# Patient Record
Sex: FEMALE | Race: WHITE | Employment: FULL TIME | URBAN - METROPOLITAN AREA
[De-identification: names, ages, dates, MRNs, and addresses within clinical notes are randomized per-mention and may not be internally consistent; named-entity substitution may affect disease eponyms.]

---

## 2017-02-16 ENCOUNTER — OFFICE VISIT (OUTPATIENT)
Dept: OBGYN | Age: 47
End: 2017-02-16

## 2017-02-16 VITALS
DIASTOLIC BLOOD PRESSURE: 60 MMHG | SYSTOLIC BLOOD PRESSURE: 90 MMHG | WEIGHT: 169 LBS | BODY MASS INDEX: 26.53 KG/M2 | HEIGHT: 67 IN

## 2017-02-16 DIAGNOSIS — Z12.39 BREAST CANCER SCREENING: ICD-10-CM

## 2017-02-16 DIAGNOSIS — Z01.419 NORMAL GYNECOLOGIC EXAMINATION: Primary | ICD-10-CM

## 2017-02-16 PROCEDURE — 99396 PREV VISIT EST AGE 40-64: CPT | Performed by: OBSTETRICS & GYNECOLOGY

## 2017-02-16 RX ORDER — NICOTINE POLACRILEX 4 MG/1
20 GUM, CHEWING ORAL DAILY
COMMUNITY
End: 2018-10-08

## 2017-02-16 RX ORDER — IBUPROFEN 600 MG/1
600 TABLET ORAL EVERY 6 HOURS PRN
Qty: 180 TABLET | Refills: 4 | Status: SHIPPED | OUTPATIENT
Start: 2017-02-16 | End: 2018-10-08 | Stop reason: SDUPTHER

## 2017-02-17 LAB — PAP SMEAR: NORMAL

## 2017-02-28 ENCOUNTER — HOSPITAL ENCOUNTER (OUTPATIENT)
Dept: WOMENS IMAGING | Age: 47
Discharge: HOME | End: 2017-02-28
Payer: COMMERCIAL

## 2017-02-28 DIAGNOSIS — Z01.419 NORMAL GYNECOLOGIC EXAMINATION: ICD-10-CM

## 2017-02-28 DIAGNOSIS — Z12.39 BREAST CANCER SCREENING: ICD-10-CM

## 2017-02-28 PROCEDURE — G0202 SCR MAMMO BI INCL CAD: HCPCS

## 2018-10-08 ENCOUNTER — OFFICE VISIT (OUTPATIENT)
Dept: OBGYN CLINIC | Age: 48
End: 2018-10-08
Payer: COMMERCIAL

## 2018-10-08 VITALS
BODY MASS INDEX: 26.84 KG/M2 | HEIGHT: 67 IN | DIASTOLIC BLOOD PRESSURE: 68 MMHG | WEIGHT: 171 LBS | SYSTOLIC BLOOD PRESSURE: 104 MMHG

## 2018-10-08 DIAGNOSIS — Z11.51 SCREENING FOR HPV (HUMAN PAPILLOMAVIRUS): ICD-10-CM

## 2018-10-08 DIAGNOSIS — Z12.39 BREAST CANCER SCREENING: ICD-10-CM

## 2018-10-08 DIAGNOSIS — Z01.419 WELL WOMAN EXAM WITH ROUTINE GYNECOLOGICAL EXAM: Primary | ICD-10-CM

## 2018-10-08 PROCEDURE — 99396 PREV VISIT EST AGE 40-64: CPT | Performed by: OBSTETRICS & GYNECOLOGY

## 2018-10-08 PROCEDURE — G8484 FLU IMMUNIZE NO ADMIN: HCPCS | Performed by: OBSTETRICS & GYNECOLOGY

## 2018-10-08 RX ORDER — IBUPROFEN 600 MG/1
600 TABLET ORAL EVERY 6 HOURS PRN
Qty: 180 TABLET | Refills: 4 | Status: SHIPPED | OUTPATIENT
Start: 2018-10-08 | End: 2020-02-13 | Stop reason: SDUPTHER

## 2018-10-08 RX ORDER — PANTOPRAZOLE SODIUM 40 MG/1
TABLET, DELAYED RELEASE ORAL
Refills: 0 | COMMUNITY
Start: 2018-09-19 | End: 2020-02-13

## 2018-10-08 NOTE — PROGRESS NOTES
with hpv pending  [facility] referral sent in   Rx ibuprofen to pharmacy  Past medical, social and family history reviewed and updated in pt's chart. Routine health screenings and precautions discussed.

## 2018-10-11 ENCOUNTER — HOSPITAL ENCOUNTER (OUTPATIENT)
Dept: WOMENS IMAGING | Age: 48
Discharge: HOME | End: 2018-10-13
Payer: COMMERCIAL

## 2018-10-11 DIAGNOSIS — Z12.39 BREAST CANCER SCREENING: ICD-10-CM

## 2018-10-11 PROCEDURE — 77067 SCR MAMMO BI INCL CAD: CPT

## 2018-10-17 DIAGNOSIS — Z11.51 SCREENING FOR HPV (HUMAN PAPILLOMAVIRUS): ICD-10-CM

## 2018-10-17 DIAGNOSIS — Z01.419 WELL WOMAN EXAM WITH ROUTINE GYNECOLOGICAL EXAM: ICD-10-CM

## 2020-02-13 ENCOUNTER — TELEPHONE (OUTPATIENT)
Dept: OBGYN CLINIC | Age: 50
End: 2020-02-13

## 2020-02-13 ENCOUNTER — OFFICE VISIT (OUTPATIENT)
Dept: OBGYN CLINIC | Age: 50
End: 2020-02-13
Payer: COMMERCIAL

## 2020-02-13 VITALS
SYSTOLIC BLOOD PRESSURE: 116 MMHG | BODY MASS INDEX: 26.37 KG/M2 | DIASTOLIC BLOOD PRESSURE: 70 MMHG | WEIGHT: 168 LBS | HEIGHT: 67 IN

## 2020-02-13 PROCEDURE — 99396 PREV VISIT EST AGE 40-64: CPT | Performed by: OBSTETRICS & GYNECOLOGY

## 2020-02-13 PROCEDURE — G8484 FLU IMMUNIZE NO ADMIN: HCPCS | Performed by: OBSTETRICS & GYNECOLOGY

## 2020-02-13 RX ORDER — MOMETASONE FUROATE 50 UG/1
SPRAY, METERED NASAL
COMMUNITY
Start: 2019-07-16 | End: 2020-02-13

## 2020-02-13 RX ORDER — IBUPROFEN 600 MG/1
600 TABLET ORAL EVERY 6 HOURS PRN
Qty: 180 TABLET | Refills: 4 | Status: SHIPPED | OUTPATIENT
Start: 2020-02-13 | End: 2022-01-31

## 2020-02-13 ASSESSMENT — PATIENT HEALTH QUESTIONNAIRE - PHQ9
SUM OF ALL RESPONSES TO PHQ QUESTIONS 1-9: 0
2. FEELING DOWN, DEPRESSED OR HOPELESS: 0
SUM OF ALL RESPONSES TO PHQ QUESTIONS 1-9: 0
SUM OF ALL RESPONSES TO PHQ9 QUESTIONS 1 & 2: 0
1. LITTLE INTEREST OR PLEASURE IN DOING THINGS: 0

## 2020-02-13 NOTE — PROGRESS NOTES
with hpv pending  [facility] referral sent in   Past medical, social and family history reviewed and updated in pt's chart. Routine health screenings and precautions discussed.

## 2020-02-17 ENCOUNTER — HOSPITAL ENCOUNTER (OUTPATIENT)
Dept: WOMENS IMAGING | Age: 50
Discharge: HOME | End: 2020-02-19
Payer: COMMERCIAL

## 2020-02-17 PROCEDURE — 77063 BREAST TOMOSYNTHESIS BI: CPT

## 2022-01-31 ENCOUNTER — OFFICE VISIT (OUTPATIENT)
Dept: OBGYN CLINIC | Age: 52
End: 2022-01-31
Payer: COMMERCIAL

## 2022-01-31 VITALS
HEIGHT: 66 IN | DIASTOLIC BLOOD PRESSURE: 68 MMHG | BODY MASS INDEX: 27.48 KG/M2 | WEIGHT: 171 LBS | SYSTOLIC BLOOD PRESSURE: 90 MMHG

## 2022-01-31 DIAGNOSIS — R61 NIGHT SWEATS: ICD-10-CM

## 2022-01-31 DIAGNOSIS — Z12.31 SCREENING MAMMOGRAM, ENCOUNTER FOR: ICD-10-CM

## 2022-01-31 DIAGNOSIS — Z11.51 ENCOUNTER FOR SCREENING FOR HUMAN PAPILLOMAVIRUS (HPV): ICD-10-CM

## 2022-01-31 DIAGNOSIS — Z01.419 ENCOUNTER FOR WELL WOMAN EXAM WITH ROUTINE GYNECOLOGICAL EXAM: Primary | ICD-10-CM

## 2022-01-31 DIAGNOSIS — Z12.11 ENCOUNTER FOR SCREENING COLONOSCOPY: ICD-10-CM

## 2022-01-31 LAB — TSH REFLEX: 1.11 UIU/ML (ref 0.44–3.86)

## 2022-01-31 PROCEDURE — 99396 PREV VISIT EST AGE 40-64: CPT | Performed by: OBSTETRICS & GYNECOLOGY

## 2022-01-31 PROCEDURE — G8484 FLU IMMUNIZE NO ADMIN: HCPCS | Performed by: OBSTETRICS & GYNECOLOGY

## 2022-01-31 PROCEDURE — 36415 COLL VENOUS BLD VENIPUNCTURE: CPT | Performed by: OBSTETRICS & GYNECOLOGY

## 2022-01-31 RX ORDER — IBUPROFEN 600 MG/1
600 TABLET ORAL EVERY 6 HOURS PRN
Qty: 120 TABLET | Refills: 3 | Status: SHIPPED | OUTPATIENT
Start: 2022-01-31

## 2022-01-31 ASSESSMENT — PATIENT HEALTH QUESTIONNAIRE - PHQ9
SUM OF ALL RESPONSES TO PHQ QUESTIONS 1-9: 0
2. FEELING DOWN, DEPRESSED OR HOPELESS: 0
SUM OF ALL RESPONSES TO PHQ QUESTIONS 1-9: 0
SUM OF ALL RESPONSES TO PHQ QUESTIONS 1-9: 0
1. LITTLE INTEREST OR PLEASURE IN DOING THINGS: 0
SUM OF ALL RESPONSES TO PHQ9 QUESTIONS 1 & 2: 0
SUM OF ALL RESPONSES TO PHQ QUESTIONS 1-9: 0

## 2022-01-31 NOTE — PROGRESS NOTES
SUBJECTIVE:   49 y.o.  female here for annual exam. Pt with irregular menses. PT does report increasing night sweats. PT interested in hormone testing. Review of Systems:  General ROS: negative  Psychological ROS: negative  ENT ROS: negative  Endocrine ROS: negative  Respiratory ROS: no cough, shortness of breath, or wheezing  Cardiovascular ROS: no chest pain or dyspnea on exertion  Gastrointestinal ROS: no abdominal pain, change in bowel habits, or black or bloody stools  Genito-Urinary ROS: no dysuria, trouble voiding, or hematuria  Musculoskeletal ROS: negative  Neurological ROS: no TIA or stroke symptoms  Dermatological ROS: negative    OBJECTIVE:   BP 90/68   Ht 5' 6\" (1.676 m)   Wt 171 lb (77.6 kg)   LMP 2022   BMI 27.60 kg/m²     Physical Exam:  CONSTITUTIONAL: She appears well nourished and developed   NEUROLOGIC: Alert and oriented to time, place and person  NECK: no thyroidmegaly  BREASTS: Bilateral breasts without masses, lesions or nipple discharge  LUNGS: Clear to ascultation bilaterally  CVS: regular rate and rhythm  LYMPHATIC: No palpable lymph nodes  ABDOMEN: benign, soft, nontender, no masses. No liver or splenic organomegaly. No evidence of abdominal or inguinal hernia. No indication for occult blood testing  SKIN: normal texture and tone, no lesions  NEURO: normal tone, no hyperreflexia, 1+DTRs throughout    Pelvic Exam:   EFG: normal external genitalia  URETHRAL MEATUS: normal size, no diverticula   URETHRA: normal appearing without diverticula or lesions  BLADDER:  No masses or tenderness  VAGINA: normal rugae, no discharge   CERVIX: parous, no lesions  UTERUS: uterus is normal size, shape, consistency and nontender   ADNEXA: normal adnexa in size, nontender and no masses. PERINEUM: normal appearing without lesions or masses  RECTUM: no hemorrhoids or rectal masses.      ASSESSMENT:   Routine gynecologic exam  Night sweats  Breast cancer screening    PLAN:   Pap with hpv pending  [facility] referral sent in   Referral for colonoscopy   rx ibuprofen to pharmacy   FSH/TSH pending  Past medical, social and family history reviewed and updated in pt's chart. Routine health screenings and precautions discussed.

## 2022-01-31 NOTE — PROGRESS NOTES
The patient was asked if she would like a chaperone present for her intimate exam. She  Declined the chaperone.  George Rosas MA

## 2022-02-01 LAB — FOLLICLE STIMULATING HORMONE: 16.9 U/L (ref 1.7–21.5)

## 2022-02-09 DIAGNOSIS — Z11.51 ENCOUNTER FOR SCREENING FOR HUMAN PAPILLOMAVIRUS (HPV): ICD-10-CM

## 2022-02-09 DIAGNOSIS — Z01.419 ENCOUNTER FOR WELL WOMAN EXAM WITH ROUTINE GYNECOLOGICAL EXAM: ICD-10-CM

## 2022-02-23 ENCOUNTER — HOSPITAL ENCOUNTER (OUTPATIENT)
Dept: WOMENS IMAGING | Age: 52
Discharge: HOME | End: 2022-02-25
Payer: COMMERCIAL

## 2022-02-23 VITALS — HEIGHT: 66 IN | BODY MASS INDEX: 27.6 KG/M2

## 2022-02-23 DIAGNOSIS — Z12.31 SCREENING MAMMOGRAM, ENCOUNTER FOR: ICD-10-CM

## 2022-02-23 PROCEDURE — 77063 BREAST TOMOSYNTHESIS BI: CPT

## 2023-02-24 ENCOUNTER — TRANSCRIBE ORDERS (OUTPATIENT)
Dept: ULTRASOUND IMAGING | Age: 53
End: 2023-02-24

## 2023-02-24 ENCOUNTER — HOSPITAL ENCOUNTER (OUTPATIENT)
Dept: WOMENS IMAGING | Age: 53
Discharge: HOME | End: 2023-02-24
Payer: COMMERCIAL

## 2023-02-24 DIAGNOSIS — Z12.31 SCREENING MAMMOGRAM FOR BREAST CANCER: Primary | ICD-10-CM

## 2023-02-24 DIAGNOSIS — Z12.31 SCREENING MAMMOGRAM FOR BREAST CANCER: ICD-10-CM

## 2023-02-24 PROCEDURE — 77067 SCR MAMMO BI INCL CAD: CPT

## 2023-10-31 ENCOUNTER — OFFICE VISIT (OUTPATIENT)
Dept: PRIMARY CARE | Facility: CLINIC | Age: 53
End: 2023-10-31
Payer: COMMERCIAL

## 2023-10-31 ENCOUNTER — APPOINTMENT (OUTPATIENT)
Dept: PRIMARY CARE | Facility: CLINIC | Age: 53
End: 2023-10-31
Payer: COMMERCIAL

## 2023-10-31 VITALS
WEIGHT: 170 LBS | OXYGEN SATURATION: 98 % | BODY MASS INDEX: 26.68 KG/M2 | HEIGHT: 67 IN | DIASTOLIC BLOOD PRESSURE: 74 MMHG | SYSTOLIC BLOOD PRESSURE: 120 MMHG | HEART RATE: 66 BPM

## 2023-10-31 DIAGNOSIS — D50.8 HYPOCHROMIC ERYTHROCYTES: ICD-10-CM

## 2023-10-31 DIAGNOSIS — Z12.11 SCREEN FOR COLON CANCER: ICD-10-CM

## 2023-10-31 DIAGNOSIS — R13.10 DYSPHAGIA, UNSPECIFIED TYPE: ICD-10-CM

## 2023-10-31 DIAGNOSIS — Z86.2 HISTORY OF ANEMIA: ICD-10-CM

## 2023-10-31 DIAGNOSIS — Z11.59 NEED FOR HEPATITIS C SCREENING TEST: ICD-10-CM

## 2023-10-31 DIAGNOSIS — Z79.899 DRUG THERAPY: ICD-10-CM

## 2023-10-31 DIAGNOSIS — Z00.00 PREVENTATIVE HEALTH CARE: Primary | ICD-10-CM

## 2023-10-31 DIAGNOSIS — E55.9 VITAMIN D DEFICIENCY: ICD-10-CM

## 2023-10-31 DIAGNOSIS — Z13.9 SCREENING FOR CONDITION: ICD-10-CM

## 2023-10-31 PROCEDURE — 99396 PREV VISIT EST AGE 40-64: CPT | Performed by: FAMILY MEDICINE

## 2023-10-31 NOTE — PROGRESS NOTES
Subjective   Patient ID: Elisabeth Walker is a 53 y.o. female who presents for Establish Care (Pt in today to establish care).    Review of Systems  Denies N/V/D/C, no HA/S/V, denies rashes/lesions, no CP/SOB. Denies fevers/chills.  All other systems were negative.    Objective   Physical Exam  Gen: NAD  eyes: EOMI, PERRLA  ENT: hearing grossly intact, no nasal discharge  resp: CTABL, without R/R  heart: RRR without MRG  GI: abd: S/ND/NT, BS+  lymph: no axillary, cervical, supraclavicular lymphadenopathy noted   MS: gait grossly WNL,  derm: no rashes or lesions noted  neuro: CN II-XII grossly intact  psych: A&Ox3    Assessment/Plan   Diagnoses and all orders for this visit:  Preventative health care  -     Ferritin; Future  -     Iron and TIBC; Future  -     Vitamin B12; Future  -     Folate; Future  -     CBC and Auto Differential; Future  -     Comprehensive Metabolic Panel; Future  -     TSH with reflex to Free T4 if abnormal; Future  -     Magnesium; Future  -     Lipid Panel; Future  -     Hemoglobin A1C; Future  -     Urinalysis with Reflex Microscopic; Future  -     Vitamin D 25-Hydroxy,Total (for eval of Vitamin D levels); Future  -     Hepatitis C Antibody; Future  Dysphagia, unspecified type  -     Referral to Gastroenterology; Future  Screen for colon cancer  -     Referral to Gastroenterology; Future  Drug therapy  -     CBC and Auto Differential; Future  -     Comprehensive Metabolic Panel; Future  -     Magnesium; Future  -     Urinalysis with Reflex Microscopic; Future  Screening for condition  -     TSH with reflex to Free T4 if abnormal; Future  -     Lipid Panel; Future  -     Hemoglobin A1C; Future  Hypochromic erythrocytes  -     Ferritin; Future  -     Iron and TIBC; Future  -     Vitamin B12; Future  -     Folate; Future  -     CBC and Auto Differential; Future  Need for hepatitis C screening test  -     Hepatitis C Antibody; Future  History of anemia  Vitamin D deficiency  -     Vitamin D  25-Hydroxy,Total (for eval of Vitamin D levels); Future         Preventative visit, establish.,  Order labs.    ----  Screening for colon cancer.  Patient is due.  Referred to gastroenterology for dysphagia.    Screening for cervical cancer as well as breast cancer, sees OB/GYN.  Follow-up as planned.    Screening for hepatitis C will be done with next labs.      Due for annual flu shot, coronavirus series, shingles series.    Patient believes most recent tetanus shot was around 2019.  -->> Check with your pharmacy to keep up-to-date on immunizations.  -----      dysphagia, due for screening colonoscopy. -->> Will refer to gastroenterology for evaluation and treatment.    Hypochromia with history of iron deficiency with pregnancy. -->>  We will check anemia panel with labs.    Drug therapy, screening for condition, vitamin D deficiency.  Takes 4000 units daily. -->>  Recommend increasing to 5000 units daily.  Will check with annual labs.      - Return mid July for annual preventative visit with annual lab review.  - Patient will go to [facility] or other  facility to get fasting annual labs including an anemia panel which were ordered today.

## 2023-11-01 ENCOUNTER — OFFICE VISIT (OUTPATIENT)
Dept: GASTROENTEROLOGY | Facility: CLINIC | Age: 53
End: 2023-11-01
Payer: COMMERCIAL

## 2023-11-01 VITALS
BODY MASS INDEX: 27.64 KG/M2 | WEIGHT: 172 LBS | HEART RATE: 67 BPM | HEIGHT: 66 IN | SYSTOLIC BLOOD PRESSURE: 121 MMHG | DIASTOLIC BLOOD PRESSURE: 74 MMHG

## 2023-11-01 DIAGNOSIS — Z12.11 SCREEN FOR COLON CANCER: ICD-10-CM

## 2023-11-01 DIAGNOSIS — R13.10 DYSPHAGIA, UNSPECIFIED TYPE: ICD-10-CM

## 2023-11-01 PROCEDURE — 99204 OFFICE O/P NEW MOD 45 MIN: CPT | Performed by: NURSE PRACTITIONER

## 2023-11-01 PROCEDURE — 1036F TOBACCO NON-USER: CPT | Performed by: NURSE PRACTITIONER

## 2023-11-01 RX ORDER — IBUPROFEN 600 MG/1
600 TABLET ORAL EVERY 6 HOURS PRN
COMMUNITY
Start: 2022-12-29 | End: 2023-12-18 | Stop reason: ALTCHOICE

## 2023-11-01 RX ORDER — CALCIUM CARBONATE 600 MG
TABLET ORAL
COMMUNITY
Start: 2019-07-16 | End: 2023-12-18 | Stop reason: ALTCHOICE

## 2023-11-01 RX ORDER — PANTOPRAZOLE SODIUM 40 MG/1
40 TABLET, DELAYED RELEASE ORAL
Qty: 30 TABLET | Refills: 2 | Status: SHIPPED | OUTPATIENT
Start: 2023-11-01 | End: 2024-01-22

## 2023-11-01 NOTE — PROGRESS NOTES
"Assessment/Plan   Elisabeth Walker is a 53 y.o. healthy female who presents to GI clinic for concern with esophageal phase odynophagia/dysphagia and need for screening colonoscopy.  No red flag symptoms for CRC.    Odynophagia, dysphagia-esophageal phase  Screening for colon rectal cancer    Recommend EGD +/- dilation to be done at the [facility].  Patient informed she will need a  the day of the procedure  Please have blood work drawn prior to procedures  Start pantoprazole 40 mg p.o. once daily  Avoid NSAIDs  Recommend screening colonoscopy  Bowel prep per the endoscopist preference  Follow-up with GI 2 weeks after EGD and colonoscopy to review results.     Antonina Pham, APRN-CNP         Subjective     History of Present Illness:   Elisabeth Walker is a 53 y.o. healthy female who presents to GI clinic for concern with odynophagia, dysphagia and need for screening colonoscopy.      Patient reports having several episodes in which she swallowed food and had pain in her mid chest, felt as if food \"was stuck\".  She did have some mucus but no vomiting and eventually the food moved forward.  She has occasional heartburn or reflux but denies any daily symptoms.  No black or bloody stools.  Patient states she had EGD >10-15 years ago, results normal she thinks.  Results are unavailable through electronic record.  Patient does not take PPI or H2 blocker.  Denies routine NSAID use.     Patient denies unintentional weight loss, abdominal pain, nausea, vomiting, diarrhea or constipation.  No change in bowel function or caliber of stools.  No black or bloody stools. Patient has never had colonoscopy, requests screening colonoscopy.  Smoked for 10-15 years, quit in 2005.   No family history of CRC.   Review of Systems  ROS Negative unless otherwise stated above.    Past Medical History  none    Social History   reports that she has quit smoking. Her smoking use included cigarettes. She has never used " "smokeless tobacco. She reports current alcohol use. She reports that she does not use drugs.     Family History  family history includes Diabetes in her maternal grandmother and mother; Parkinsonism in her paternal grandfather.  No family history of CRC or IBD.    Allergies  No Known Allergies    Medications  Current Outpatient Medications   Medication Instructions    calcium carbonate 600 mg calcium (1,500 mg) tablet oral    ibuprofen 600 mg, oral, Every 6 hours PRN    omega-3 fatty acids 500 mg capsule oral    pantoprazole (PROTONIX) 40 mg, oral, Daily before breakfast, Do not crush, chew, or split.    ZINC PO oral        Objective   Visit Vitals  /74   Pulse 67      .FLOWAMB[14      General: A&Ox3, NAD.  HEENT: AT/NC.   CV: RRR. No murmur.  Resp: CTA bilaterally. No wheezing, rhonchi or rales.   GI: Soft, NT/ND. BSx4.  Extrem: No edema. Pulses intact.  Skin: No Jaundice.   Neuro: No focal deficits.   Psych: Normal mood and affect.     Lab Results   Component Value Date    WBC 6.2 07/24/2019    HGB 13.3 07/24/2019    HCT 42.2 07/24/2019     07/24/2019     Lab Results   Component Value Date     07/24/2019    K 4.0 07/24/2019     07/24/2019    CO2 24 07/24/2019    BUN 10 07/24/2019    CREATININE 0.65 07/24/2019    GLUCOSE 95 07/24/2019    CALCIUM 9.6 07/24/2019       Lab Results   Component Value Date    ALKPHOS 55 07/24/2019    BILITOT 0.8 07/24/2019    PROT 6.9 07/24/2019    ALT 16 07/24/2019    AST 20 07/24/2019      No results found for: \"INR\"                             "

## 2023-11-01 NOTE — PATIENT INSTRUCTIONS
Preventative visit, establish.,  Order labs.    ----  Screening for colon cancer.  Patient is due.  Referred to gastroenterology for dysphagia.    Screening for cervical cancer as well as breast cancer, sees OB/GYN.  Follow-up as planned.    Screening for hepatitis C will be done with next labs.      Due for annual flu shot, coronavirus series, shingles series.    Patient believes most recent tetanus shot was around 2019.  -->> Check with your pharmacy to keep up-to-date on immunizations.  -----      dysphagia, due for screening colonoscopy. -->> Will refer to gastroenterology for evaluation and treatment.    Hypochromia with history of iron deficiency with pregnancy. -->>  We will check anemia panel with labs.    Drug therapy, screening for condition, vitamin D deficiency.  Takes 4000 units daily. -->>  Recommend increasing to 5000 units daily.  Will check with annual labs.      - Return mid July for annual preventative visit with annual lab review.  - Patient will go to [facility] or other  facility to get fasting annual labs including an anemia panel which were ordered today.

## 2023-12-06 ENCOUNTER — TELEPHONE (OUTPATIENT)
Dept: PRIMARY CARE | Facility: CLINIC | Age: 53
End: 2023-12-06
Payer: COMMERCIAL

## 2023-12-06 DIAGNOSIS — D50.8 HYPOCHROMIC ERYTHROCYTES: ICD-10-CM

## 2023-12-06 DIAGNOSIS — Z79.899 DRUG THERAPY: Primary | ICD-10-CM

## 2023-12-06 DIAGNOSIS — Z86.2 HISTORY OF ANEMIA: ICD-10-CM

## 2023-12-06 DIAGNOSIS — E55.9 VITAMIN D DEFICIENCY: ICD-10-CM

## 2023-12-06 DIAGNOSIS — Z00.00 PREVENTATIVE HEALTH CARE: ICD-10-CM

## 2023-12-06 DIAGNOSIS — Z13.9 SCREENING FOR CONDITION: ICD-10-CM

## 2023-12-06 DIAGNOSIS — Z11.59 NEED FOR HEPATITIS C SCREENING TEST: ICD-10-CM

## 2023-12-06 NOTE — TELEPHONE ENCOUNTER
Pt is calling says she had her appointment with GI and she is scheduled for colonoscopy and endoscopy but they are wanting her to have blood work done prior.    Pt is asking if you would be able to change dates on her current orders so that Lab can draw them please?

## 2023-12-07 ENCOUNTER — LAB (OUTPATIENT)
Dept: LAB | Facility: LAB | Age: 53
End: 2023-12-07
Payer: COMMERCIAL

## 2023-12-07 DIAGNOSIS — D50.8 HYPOCHROMIC ERYTHROCYTES: ICD-10-CM

## 2023-12-07 DIAGNOSIS — Z00.00 PREVENTATIVE HEALTH CARE: ICD-10-CM

## 2023-12-07 DIAGNOSIS — Z13.9 SCREENING FOR CONDITION: ICD-10-CM

## 2023-12-07 DIAGNOSIS — E55.9 VITAMIN D DEFICIENCY: ICD-10-CM

## 2023-12-07 DIAGNOSIS — Z86.2 HISTORY OF ANEMIA: ICD-10-CM

## 2023-12-07 DIAGNOSIS — Z79.899 DRUG THERAPY: ICD-10-CM

## 2023-12-07 DIAGNOSIS — Z11.59 NEED FOR HEPATITIS C SCREENING TEST: ICD-10-CM

## 2023-12-07 LAB
25(OH)D3 SERPL-MCNC: 27 NG/ML (ref 30–100)
ALBUMIN SERPL BCP-MCNC: 4.1 G/DL (ref 3.4–5)
ALP SERPL-CCNC: 55 U/L (ref 33–110)
ALT SERPL W P-5'-P-CCNC: 14 U/L (ref 7–45)
ANION GAP SERPL CALC-SCNC: 13 MMOL/L (ref 10–20)
APPEARANCE UR: ABNORMAL
AST SERPL W P-5'-P-CCNC: 17 U/L (ref 9–39)
BASOPHILS # BLD AUTO: 0.05 X10*3/UL (ref 0–0.1)
BASOPHILS NFR BLD AUTO: 0.9 %
BILIRUB SERPL-MCNC: 0.5 MG/DL (ref 0–1.2)
BILIRUB UR STRIP.AUTO-MCNC: NEGATIVE MG/DL
BUN SERPL-MCNC: 9 MG/DL (ref 6–23)
CALCIUM SERPL-MCNC: 9.4 MG/DL (ref 8.6–10.3)
CHLORIDE SERPL-SCNC: 104 MMOL/L (ref 98–107)
CHOLEST SERPL-MCNC: 167 MG/DL (ref 0–199)
CHOLESTEROL/HDL RATIO: 2.3
CO2 SERPL-SCNC: 27 MMOL/L (ref 21–32)
COLOR UR: YELLOW
CREAT SERPL-MCNC: 0.65 MG/DL (ref 0.5–1.05)
EOSINOPHIL # BLD AUTO: 0.17 X10*3/UL (ref 0–0.7)
EOSINOPHIL NFR BLD AUTO: 3 %
ERYTHROCYTE [DISTWIDTH] IN BLOOD BY AUTOMATED COUNT: 12.6 % (ref 11.5–14.5)
EST. AVERAGE GLUCOSE BLD GHB EST-MCNC: 100 MG/DL
FERRITIN SERPL-MCNC: 58 NG/ML (ref 8–150)
FOLATE SERPL-MCNC: 17.5 NG/ML
GFR SERPL CREATININE-BSD FRML MDRD: >90 ML/MIN/1.73M*2
GLUCOSE SERPL-MCNC: 87 MG/DL (ref 74–99)
GLUCOSE UR STRIP.AUTO-MCNC: NEGATIVE MG/DL
HBA1C MFR BLD: 5.1 %
HCT VFR BLD AUTO: 39.3 % (ref 36–46)
HCV AB SER QL: NONREACTIVE
HDLC SERPL-MCNC: 73.4 MG/DL
HGB BLD-MCNC: 12.9 G/DL (ref 12–16)
IMM GRANULOCYTES # BLD AUTO: 0.01 X10*3/UL (ref 0–0.7)
IMM GRANULOCYTES NFR BLD AUTO: 0.2 % (ref 0–0.9)
IRON SATN MFR SERPL: 29 % (ref 25–45)
IRON SERPL-MCNC: 119 UG/DL (ref 35–150)
KETONES UR STRIP.AUTO-MCNC: NEGATIVE MG/DL
LDLC SERPL CALC-MCNC: 81 MG/DL
LEUKOCYTE ESTERASE UR QL STRIP.AUTO: NEGATIVE
LYMPHOCYTES # BLD AUTO: 2 X10*3/UL (ref 1.2–4.8)
LYMPHOCYTES NFR BLD AUTO: 35.2 %
MAGNESIUM SERPL-MCNC: 2 MG/DL (ref 1.6–2.4)
MCH RBC QN AUTO: 30.8 PG (ref 26–34)
MCHC RBC AUTO-ENTMCNC: 32.8 G/DL (ref 32–36)
MCV RBC AUTO: 94 FL (ref 80–100)
MONOCYTES # BLD AUTO: 0.69 X10*3/UL (ref 0.1–1)
MONOCYTES NFR BLD AUTO: 12.1 %
NEUTROPHILS # BLD AUTO: 2.76 X10*3/UL (ref 1.2–7.7)
NEUTROPHILS NFR BLD AUTO: 48.6 %
NITRITE UR QL STRIP.AUTO: NEGATIVE
NON HDL CHOLESTEROL: 94 MG/DL (ref 0–149)
NRBC BLD-RTO: 0 /100 WBCS (ref 0–0)
PH UR STRIP.AUTO: 6 [PH]
PLATELET # BLD AUTO: 261 X10*3/UL (ref 150–450)
POTASSIUM SERPL-SCNC: 4.6 MMOL/L (ref 3.5–5.3)
PROT SERPL-MCNC: 6.6 G/DL (ref 6.4–8.2)
PROT UR STRIP.AUTO-MCNC: NEGATIVE MG/DL
RBC # BLD AUTO: 4.19 X10*6/UL (ref 4–5.2)
RBC # UR STRIP.AUTO: NEGATIVE /UL
SODIUM SERPL-SCNC: 139 MMOL/L (ref 136–145)
SP GR UR STRIP.AUTO: 1.01
TIBC SERPL-MCNC: 412 UG/DL (ref 240–445)
TRIGL SERPL-MCNC: 63 MG/DL (ref 0–149)
TSH SERPL-ACNC: 1.14 MIU/L (ref 0.44–3.98)
UIBC SERPL-MCNC: 293 UG/DL (ref 110–370)
UROBILINOGEN UR STRIP.AUTO-MCNC: <2 MG/DL
VIT B12 SERPL-MCNC: 396 PG/ML (ref 211–911)
VLDL: 13 MG/DL (ref 0–40)
WBC # BLD AUTO: 5.7 X10*3/UL (ref 4.4–11.3)

## 2023-12-07 PROCEDURE — 81003 URINALYSIS AUTO W/O SCOPE: CPT

## 2023-12-07 PROCEDURE — 83540 ASSAY OF IRON: CPT

## 2023-12-07 PROCEDURE — 83036 HEMOGLOBIN GLYCOSYLATED A1C: CPT

## 2023-12-07 PROCEDURE — 83550 IRON BINDING TEST: CPT

## 2023-12-07 PROCEDURE — 82728 ASSAY OF FERRITIN: CPT

## 2023-12-07 PROCEDURE — 80053 COMPREHEN METABOLIC PANEL: CPT

## 2023-12-07 PROCEDURE — 84443 ASSAY THYROID STIM HORMONE: CPT

## 2023-12-07 PROCEDURE — 80061 LIPID PANEL: CPT

## 2023-12-07 PROCEDURE — 36415 COLL VENOUS BLD VENIPUNCTURE: CPT

## 2023-12-07 PROCEDURE — 83735 ASSAY OF MAGNESIUM: CPT

## 2023-12-07 PROCEDURE — 82306 VITAMIN D 25 HYDROXY: CPT

## 2023-12-07 PROCEDURE — 82746 ASSAY OF FOLIC ACID SERUM: CPT

## 2023-12-07 PROCEDURE — 86803 HEPATITIS C AB TEST: CPT

## 2023-12-07 PROCEDURE — 82607 VITAMIN B-12: CPT

## 2023-12-07 PROCEDURE — 85025 COMPLETE CBC W/AUTO DIFF WBC: CPT

## 2023-12-09 ENCOUNTER — ANESTHESIA EVENT (OUTPATIENT)
Dept: GASTROENTEROLOGY | Facility: EXTERNAL LOCATION | Age: 53
End: 2023-12-09

## 2023-12-18 ENCOUNTER — ANESTHESIA (OUTPATIENT)
Dept: GASTROENTEROLOGY | Facility: EXTERNAL LOCATION | Age: 53
End: 2023-12-18

## 2023-12-18 ENCOUNTER — HOSPITAL ENCOUNTER (OUTPATIENT)
Dept: GASTROENTEROLOGY | Facility: EXTERNAL LOCATION | Age: 53
Discharge: HOME | End: 2023-12-18
Payer: COMMERCIAL

## 2023-12-18 VITALS
WEIGHT: 170 LBS | DIASTOLIC BLOOD PRESSURE: 73 MMHG | SYSTOLIC BLOOD PRESSURE: 118 MMHG | HEIGHT: 66 IN | BODY MASS INDEX: 27.32 KG/M2 | HEART RATE: 69 BPM | RESPIRATION RATE: 15 BRPM | TEMPERATURE: 97.7 F | OXYGEN SATURATION: 100 %

## 2023-12-18 DIAGNOSIS — Z12.11 SCREEN FOR COLON CANCER: Primary | ICD-10-CM

## 2023-12-18 DIAGNOSIS — R13.10 DYSPHAGIA, UNSPECIFIED TYPE: ICD-10-CM

## 2023-12-18 LAB — PREGNANCY TEST URINE, POC: NEGATIVE

## 2023-12-18 PROCEDURE — 88305 TISSUE EXAM BY PATHOLOGIST: CPT

## 2023-12-18 PROCEDURE — 88305 TISSUE EXAM BY PATHOLOGIST: CPT | Performed by: PATHOLOGY

## 2023-12-18 PROCEDURE — 43239 EGD BIOPSY SINGLE/MULTIPLE: CPT | Performed by: INTERNAL MEDICINE

## 2023-12-18 PROCEDURE — 81025 URINE PREGNANCY TEST: CPT | Performed by: INTERNAL MEDICINE

## 2023-12-18 PROCEDURE — 45378 DIAGNOSTIC COLONOSCOPY: CPT | Performed by: INTERNAL MEDICINE

## 2023-12-18 RX ORDER — PROPOFOL 10 MG/ML
INJECTION, EMULSION INTRAVENOUS AS NEEDED
Status: DISCONTINUED | OUTPATIENT
Start: 2023-12-18 | End: 2023-12-18

## 2023-12-18 RX ORDER — ONDANSETRON HYDROCHLORIDE 2 MG/ML
4 INJECTION, SOLUTION INTRAVENOUS ONCE AS NEEDED
Status: DISCONTINUED | OUTPATIENT
Start: 2023-12-18 | End: 2023-12-19 | Stop reason: HOSPADM

## 2023-12-18 RX ORDER — SODIUM CHLORIDE 9 MG/ML
20 INJECTION, SOLUTION INTRAVENOUS CONTINUOUS
Status: DISCONTINUED | OUTPATIENT
Start: 2023-12-18 | End: 2023-12-19 | Stop reason: HOSPADM

## 2023-12-18 RX ORDER — LIDOCAINE HYDROCHLORIDE 20 MG/ML
INJECTION, SOLUTION EPIDURAL; INFILTRATION; INTRACAUDAL; PERINEURAL AS NEEDED
Status: DISCONTINUED | OUTPATIENT
Start: 2023-12-18 | End: 2023-12-18

## 2023-12-18 RX ADMIN — PROPOFOL 100 MG: 10 INJECTION, EMULSION INTRAVENOUS at 07:36

## 2023-12-18 RX ADMIN — PROPOFOL 50 MG: 10 INJECTION, EMULSION INTRAVENOUS at 07:48

## 2023-12-18 RX ADMIN — SODIUM CHLORIDE: 9 INJECTION, SOLUTION INTRAVENOUS at 07:34

## 2023-12-18 RX ADMIN — PROPOFOL 50 MG: 10 INJECTION, EMULSION INTRAVENOUS at 07:41

## 2023-12-18 RX ADMIN — PROPOFOL 20 MG: 10 INJECTION, EMULSION INTRAVENOUS at 07:53

## 2023-12-18 RX ADMIN — LIDOCAINE HYDROCHLORIDE 100 MG: 20 INJECTION, SOLUTION EPIDURAL; INFILTRATION; INTRACAUDAL; PERINEURAL at 07:36

## 2023-12-18 RX ADMIN — PROPOFOL 20 MG: 10 INJECTION, EMULSION INTRAVENOUS at 07:50

## 2023-12-18 RX ADMIN — PROPOFOL 50 MG: 10 INJECTION, EMULSION INTRAVENOUS at 07:38

## 2023-12-18 RX ADMIN — PROPOFOL 20 MG: 10 INJECTION, EMULSION INTRAVENOUS at 07:55

## 2023-12-18 RX ADMIN — PROPOFOL 20 MG: 10 INJECTION, EMULSION INTRAVENOUS at 07:57

## 2023-12-18 RX ADMIN — PROPOFOL 20 MG: 10 INJECTION, EMULSION INTRAVENOUS at 08:00

## 2023-12-18 RX ADMIN — PROPOFOL 50 MG: 10 INJECTION, EMULSION INTRAVENOUS at 07:44

## 2023-12-18 SDOH — HEALTH STABILITY: MENTAL HEALTH: CURRENT SMOKER: 0

## 2023-12-18 ASSESSMENT — PAIN SCALES - GENERAL
PAIN_LEVEL: 0
PAINLEVEL_OUTOF10: 5 - MODERATE PAIN
PAINLEVEL_OUTOF10: 2
PAINLEVEL_OUTOF10: 0 - NO PAIN
PAINLEVEL_OUTOF10: 6

## 2023-12-18 ASSESSMENT — COLUMBIA-SUICIDE SEVERITY RATING SCALE - C-SSRS
1. IN THE PAST MONTH, HAVE YOU WISHED YOU WERE DEAD OR WISHED YOU COULD GO TO SLEEP AND NOT WAKE UP?: NO
2. HAVE YOU ACTUALLY HAD ANY THOUGHTS OF KILLING YOURSELF?: NO
6. HAVE YOU EVER DONE ANYTHING, STARTED TO DO ANYTHING, OR PREPARED TO DO ANYTHING TO END YOUR LIFE?: NO

## 2023-12-18 ASSESSMENT — PAIN - FUNCTIONAL ASSESSMENT
PAIN_FUNCTIONAL_ASSESSMENT: 0-10
PAIN_FUNCTIONAL_ASSESSMENT: 0-10

## 2023-12-18 NOTE — ANESTHESIA PREPROCEDURE EVALUATION
Patient: Elisabeth Walker    Procedure Information       Date/Time: 12/18/23 0800    Scheduled providers: Juan Jose Tobar MD; Rissa Ramirez RN    Procedures:       COLONOSCOPY      EGD    Location: [facility]            Relevant Problems   Anesthesia (within normal limits)      Cardiovascular (within normal limits)      Endocrine (within normal limits)      GI (within normal limits)      /Renal (within normal limits)      Neuro/Psych (within normal limits)      Pulmonary (within normal limits)      GI/Hepatic (within normal limits)      Hematology (within normal limits)      Musculoskeletal (within normal limits)      Eyes, Ears, Nose, and Throat (within normal limits)      Infectious Disease (within normal limits)       Clinical information reviewed:   Tobacco  Allergies  Meds   Med Hx  Surg Hx   Fam Hx  Soc Hx        NPO Detail:  NPO/Void Status  Date of Last Liquid: 12/18/23  Time of Last Liquid: 0300  Date of Last Solid: 12/16/23  Time of Last Solid: 2000  Last Intake Type: Clear fluids; GI prep         Physical Exam    Airway  Mallampati: II     Cardiovascular - normal exam     Dental - normal exam     Pulmonary - normal exam     Abdominal - normal exam             Anesthesia Plan    ASA 2     MAC     The patient is not a current smoker.  Patient was not previously instructed to abstain from smoking on day of procedure.  Patient did not smoke on day of procedure.    intravenous induction   Anesthetic plan and risks discussed with patient.  Use of blood products discussed with patient who consented to blood products.    Plan discussed with CRNA.

## 2023-12-18 NOTE — DISCHARGE INSTRUCTIONS
Patient Instructions Post Procedure      The anesthetics, sedatives or narcotics which were given to you today will be acting in your body for the next 24 hours, so you might feel a little sleepy or groggy.  This feeling should slowly wear off. Carefully read and follow the instructions.     You received sedation today:  - Do not drive or operate any machinery or power tools of any kind.   - No alcoholic beverages today, not even beer or wine.  - Do not make any important decisions or sign any legal documents.  - No over the counter medications that contain alcohol or that may cause drowsiness.    While it is common to experience mild to moderate abdominal distention, gas, or belching after your procedure, if any of these symptoms occur following discharge from the GI Lab or within one week of having your procedure, call the [facility] to be advised whether a visit to your nearest Urgent Care or Emergency Department is indicated.  Take this paper with you if you go.   - If you develop an allergic reaction to the medications that were given during your procedure such as difficulty breathing, rash, hives, severe nausea, vomiting or lightheadedness.  - If you experience chest pain, shortness of breath, severe abdominal pain, fevers and chills.  -If you develop signs and symptoms of bleeding such as blood in your spit, if your stools turn black, tarry, or bloody  - If you have not urinated within 8 hours following your procedure.  - If your IV site becomes painful, red, inflamed, or looks infected.    If you received a biopsy/polypectomy/sphincterotomy the following instructions apply below:  __ Do not use Aspirin containing products, non-steroidal medications or anti-coagulants for one week following your procedure. (Examples of these types of medications are: Advil, Arthrotec, Aleve, Coumadin, Ecotrin, Heparin, Ibuprofen, Indocin, Motrin, Naprosyn, Nuprin, Plavix, Vioxx, and Voltarin, or their generic  forms.  This list is not all-inclusive.  Check with your physician or pharmacist before resuming medications.)   __ Eat a soft diet today.  Avoid foods that are poorly digested for the next 24 hours.  These foods would include: nuts, beans, lettuce, red meats, and fried foods. Start with liquids and advance your diet as tolerated, gradually work up to eating solids.   __ Do not have a Barium Study or Enema for one week.    Your physician recommends the additional following instructions:    -You have a contact number available for emergencies. The signs and symptoms of potential delayed complications were discussed with you. You may return to normal activities tomorrow.  -Resume your previous diet or other if specified.  -Continue your present medications.   -We are waiting for your pathology results, if applicable.  -The findings and recommendations have been discussed with you and/or family.  - Please see Medication Reconciliation Form for new medication/medications prescribed.     If you experience any problems or have any questions following discharge from the GI Lab, please call: [phone] from 7 am- 4:30 pm.  In the event of an emergency please go to the closest Emergency Department or call Dr. Tobar @ [phone]

## 2023-12-18 NOTE — ANESTHESIA POSTPROCEDURE EVALUATION
Patient: Elisabeth Walker    Procedure Summary       Date: 12/18/23 Room / Location: [facility]    Anesthesia Start: 0734 Anesthesia Stop: 0812    Procedures:       COLONOSCOPY      EGD Diagnosis:       Screen for colon cancer      Dysphagia, unspecified type      Screen for colon cancer    Scheduled Providers: Juan Jose Tobar MD; Rissa Ramirez RN Responsible Provider: QIAN Muse    Anesthesia Type: MAC ASA Status: 2            Anesthesia Type: MAC    Vitals Value Taken Time   /71 12/18/23 0812   Temp 36.5 12/18/23 0812   Pulse 70 12/18/23 0812   Resp 14 12/18/23 0812   SpO2 98 12/18/23 0812       Anesthesia Post Evaluation    Patient location during evaluation: PACU  Patient participation: complete - patient participated  Level of consciousness: awake and alert  Pain score: 0  Pain management: adequate  Airway patency: patent  Cardiovascular status: acceptable  Respiratory status: acceptable  Hydration status: acceptable  Postoperative Nausea and Vomiting: none        There were no known notable events for this encounter.

## 2023-12-18 NOTE — H&P
Outpatient Hospital Procedure H&P    Patient Profile-Procedures  Initial Info  Patient Demographics  Name Elisabeth Walker  Date of Birth 1970  MRN [id number]  Address   [address] [address] [id number] [facility] [id number]    Primary Phone Number [phone]  Secondary Phone Number    Álvaro Dorman    Procedure(s):  EGD, colonoscopy  Primary contact name and number   Extended Emergency Contact Information  Primary Emergency Contact: Eduardo Walker   [place]  Mobile Phone: [phone]  Relation: Spouse    General Health  Weight   Vitals:    12/18/23 0723   Weight: 77.1 kg (170 lb)     BMI Body mass index is 27.44 kg/m².    Allergies  No Known Allergies    Past Medical History   Past Medical History:   Diagnosis Date    Dysphagia        Provider assessment  Diagnosis: Dysphagia, screening    Medication Reviewed - yes  Prior to Admission medications    Medication Sig Start Date End Date Taking? Authorizing Provider   pantoprazole (ProtoNix) 40 mg EC tablet Take 1 tablet (40 mg) by mouth once daily in the morning. Take before meals. Do not crush, chew, or split. 11/1/23 10/31/24 Yes MALLORIE Evangelista-CNP   ZINC PO Take by mouth.   Yes Historical Provider, MD   calcium carbonate 600 mg calcium (1,500 mg) tablet Take by mouth. 7/16/19 12/18/23  Historical Provider, MD   ibuprofen 600 mg tablet Take 1 tablet (600 mg) by mouth every 6 hours if needed. 12/29/22 12/18/23  Historical Provider, MD   omega-3 fatty acids 500 mg capsule Take by mouth. 11/17/10 12/18/23  Historical Provider, MD       Physical Exam  Vitals:    12/18/23 0723   BP: 124/79   Pulse: 69   Resp: 16   Temp: 36.7 °C (98.1 °F)   SpO2: 98%        General: A&Ox3, NAD.  HEENT: AT/NC.   CV: RRR. No murmur.  Resp: CTA bilaterally. No wheezing, rhonchi or rales.   GI: Soft, NT/ND. BSx4.  Extrem: No edema. Pulses intact.  Skin: No Jaundice.   Neuro: No focal deficits.   Psych: Normal mood  and affect.        Oropharyngeal Classification II (hard and soft palate, upper portion of tonsils anduvula visible)  ASA PS Classification 2  Sedation Plan Deep  Procedure Plan - pre-procedural (re)assesment completed by physician:  discharge/transfer patient when discharge criteria met    Juan Jose Tobar MD  12/18/2023 7:32 AM

## 2023-12-26 LAB
LABORATORY COMMENT REPORT: NORMAL
PATH REPORT.FINAL DX SPEC: NORMAL
PATH REPORT.GROSS SPEC: NORMAL
PATH REPORT.TOTAL CANCER: NORMAL

## 2024-01-02 ENCOUNTER — LAB (OUTPATIENT)
Dept: LAB | Facility: LAB | Age: 54
End: 2024-01-02
Payer: COMMERCIAL

## 2024-01-02 ENCOUNTER — OFFICE VISIT (OUTPATIENT)
Dept: GASTROENTEROLOGY | Facility: CLINIC | Age: 54
End: 2024-01-02
Payer: COMMERCIAL

## 2024-01-02 VITALS
HEART RATE: 76 BPM | BODY MASS INDEX: 28 KG/M2 | WEIGHT: 174.2 LBS | HEIGHT: 66 IN | DIASTOLIC BLOOD PRESSURE: 70 MMHG | SYSTOLIC BLOOD PRESSURE: 109 MMHG

## 2024-01-02 DIAGNOSIS — R13.10 DYSPHAGIA, UNSPECIFIED TYPE: Primary | ICD-10-CM

## 2024-01-02 DIAGNOSIS — R13.10 DYSPHAGIA, UNSPECIFIED TYPE: ICD-10-CM

## 2024-01-02 PROCEDURE — 1036F TOBACCO NON-USER: CPT | Performed by: NURSE PRACTITIONER

## 2024-01-02 PROCEDURE — 86003 ALLG SPEC IGE CRUDE XTRC EA: CPT

## 2024-01-02 PROCEDURE — 36415 COLL VENOUS BLD VENIPUNCTURE: CPT

## 2024-01-02 PROCEDURE — 99213 OFFICE O/P EST LOW 20 MIN: CPT | Performed by: NURSE PRACTITIONER

## 2024-01-02 RX ORDER — VIT C/E/ZN/COPPR/LUTEIN/ZEAXAN 250MG-90MG
1000 CAPSULE ORAL DAILY
COMMUNITY

## 2024-01-02 NOTE — PROGRESS NOTES
Assessment/Plan   Elisabeth Walker is a 53 y.o. female who presents to GI clinic for follow up dysphagia.  EGD and colonoscopy on December 18, 2023.  Colonoscopy normal.  EGD showed 2 cm hiatal hernia otherwise normal.  Esophageal biopsies x 2 show normal esophageal mucosa, no significant pathologic findings.  No explanation to patient's symptom of dysphagia however since starting pantoprazole 40 mg p.o. once daily patient states she has not had any swallowing trouble.  Patient states endoscopist thought maybe she had a food allergy.  Patient thought about it and thinks that she may have sensitivity to seafood as she has vague symptoms of  ear itching and congestion after eating seafood.  No other known food allergies.      Odynophagia, dysphagia-esophageal phase symptoms have resolved.  2.   Consider food allergy, seafood?    Check allergy profile  Consider consult with allergist if symptoms return/persist  Continue pantoprazole 40 mg p.o. once daily x 4 weeks, then discontinue  Follow-up with GI as needed    MALLORIE Evangelista-CNP      Subjective   Elisabeth Walker is a 53 y.o. female who presents to GI clinic for follow up dysphagia.  EGD and colonoscopy on December 18, 2023.  Colonoscopy normal.  EGD showed 2 cm hiatal hernia otherwise normal.  Esophageal biopsies x 2 show normal esophageal mucosa, no significant pathologic findings.  No explanation to patient's symptom of odynophagia/dysphagia however since starting pantoprazole 40 mg p.o. once daily patient states she has not had any swallowing trouble.  Patient states endoscopist thought maybe she had a food allergy.  Patient thought about it and thinks that she may have sensitivity to seafood as she has vague symptoms of  ear itching and congestion after eating seafood.  No other known food allergies.      LOV November 1, 2023  Elisabeth Walker is a 53 y.o. healthy female who presents to GI clinic for concern with odynophagia, dysphagia and need for screening  "colonoscopy.       Patient reports having several episodes in which she swallowed food and had pain in her mid chest, felt as if food \"was stuck\".  She did have some mucus but no vomiting and eventually the food moved forward.  She has occasional heartburn or reflux but denies any daily symptoms.  No black or bloody stools.  Patient states she had EGD >10-15 years ago, results normal she thinks.  Results are unavailable through electronic record.  Patient does not take PPI or H2 blocker.  Denies routine NSAID use.      Patient denies unintentional weight loss, abdominal pain, nausea, vomiting, diarrhea or constipation.  No change in bowel function or caliber of stools.  No black or bloody stools. Patient has never had colonoscopy, requests screening colonoscopy.  Smoked for 10-15 years, quit in 2005.   No family history of CRC.     Endoscopic Review   Colonoscopy December 18, 2023  · The terminal ileum appeared normal.;  · The cecum, ascending colon, hepatic flexure, transverse colon, splenic flexure, descending colon and sigmoid colon appeared normal.;  · Internal small hemorrhoids observed during retroflexion; no bleeding was identified     Repeat screening colonoscopy in 10 years, due December, 2033.    EGD December 18, 2023  · The duodenal bulb and 2nd part of the duodenum appeared normal.  · The stomach appeared normal.  · 2 cm hiatal hernia  · Performed forceps biopsies in the upper third of the esophagus and lower third of the esophagus to rule out eosinophilic esophagitis.  Distal and proximal esophagus biopsy benign, no significant pathologic finding.        Review of Systems  ROS Negative unless otherwise stated above.    Past Medical History   has a past medical history of Dysphagia.     Social History   reports that she has quit smoking. Her smoking use included cigarettes. She has never used smokeless tobacco. She reports current alcohol use. She reports that she does not use drugs.     Family " History  family history includes Diabetes in her maternal grandmother and mother; Parkinsonism in her paternal grandfather.     Allergies  No Known Allergies    Medications  Current Outpatient Medications   Medication Instructions    cholecalciferol (VITAMIN D-3) 1,000 Units, oral, Daily    pantoprazole (PROTONIX) 40 mg, oral, Daily before breakfast, Do not crush, chew, or split.    RA MAGNESIUM CITRATE PO oral    ZINC PO oral        Objective   Visit Vitals  /70   Pulse 76      .FLOWAMB[14      General: A&Ox3, NAD.  HEENT: AT/NC.   CV: RRR. No murmur.  Resp: CTA bilaterally. No wheezing, rhonchi or rales.   GI: Soft, NT/ND. BSx4.  Extrem: No edema. Pulses intact.  Skin: No Jaundice.   Neuro: No focal deficits.   Psych: Normal mood and affect.     Lab Results   Component Value Date    WBC 5.7 12/07/2023    WBC 6.2 07/24/2019    HGB 12.9 12/07/2023    HGB 13.3 07/24/2019    HCT 39.3 12/07/2023    HCT 42.2 07/24/2019     12/07/2023     07/24/2019     Lab Results   Component Value Date     12/07/2023    K 4.6 12/07/2023     12/07/2023    CO2 27 12/07/2023    BUN 9 12/07/2023    CREATININE 0.65 12/07/2023    GLUCOSE 87 12/07/2023    CALCIUM 9.4 12/07/2023       Lab Results   Component Value Date    ALKPHOS 55 12/07/2023    ALKPHOS 55 07/24/2019    BILITOT 0.5 12/07/2023    BILITOT 0.8 07/24/2019    PROT 6.6 12/07/2023    PROT 6.9 07/24/2019    ALT 14 12/07/2023    ALT 16 07/24/2019    AST 17 12/07/2023    AST 20 07/24/2019

## 2024-01-03 LAB
CLAM IGE QN: <0.1 KU/L
CODFISH IGE QN: <0.1 KU/L
CORN IGE QN: <0.1
EGG WHITE IGE QN: <0.1 KU/L
MILK IGE QN: <0.1 KU/L
PEANUT IGE QN: <0.1 KU/L
SCALLOP IGE QN: <0.1 KU/L
SESAME SEED IGE QN: 0.11 KU/L
SHRIMP IGE QN: 0.28 KU/L
SOYBEAN IGE QN: <0.1 KU/L
WALNUT IGE QN: <0.1 KU/L
WHEAT IGE QN: 0.14 KU/L

## 2024-02-17 DIAGNOSIS — R13.10 DYSPHAGIA, UNSPECIFIED TYPE: ICD-10-CM

## 2024-02-19 RX ORDER — PANTOPRAZOLE SODIUM 40 MG/1
40 TABLET, DELAYED RELEASE ORAL
Qty: 30 TABLET | Refills: 0 | Status: SHIPPED | OUTPATIENT
Start: 2024-02-19 | End: 2024-03-18 | Stop reason: SDUPTHER

## 2024-03-16 DIAGNOSIS — R13.10 DYSPHAGIA, UNSPECIFIED TYPE: ICD-10-CM

## 2024-03-18 RX ORDER — PANTOPRAZOLE SODIUM 40 MG/1
40 TABLET, DELAYED RELEASE ORAL
Qty: 30 TABLET | Refills: 0 | Status: SHIPPED | OUTPATIENT
Start: 2024-03-18 | End: 2024-04-12

## 2024-04-12 DIAGNOSIS — R13.10 DYSPHAGIA, UNSPECIFIED TYPE: ICD-10-CM

## 2024-04-12 RX ORDER — PANTOPRAZOLE SODIUM 40 MG/1
40 TABLET, DELAYED RELEASE ORAL
Qty: 30 TABLET | Refills: 0 | Status: SHIPPED | OUTPATIENT
Start: 2024-04-12 | End: 2024-05-14

## 2024-05-09 DIAGNOSIS — R13.10 DYSPHAGIA, UNSPECIFIED TYPE: ICD-10-CM

## 2024-05-14 RX ORDER — PANTOPRAZOLE SODIUM 40 MG/1
40 TABLET, DELAYED RELEASE ORAL
Qty: 30 TABLET | Refills: 0 | Status: SHIPPED | OUTPATIENT
Start: 2024-05-14

## 2024-06-11 ENCOUNTER — OFFICE VISIT (OUTPATIENT)
Dept: OBGYN CLINIC | Age: 54
End: 2024-06-11
Payer: COMMERCIAL

## 2024-06-11 VITALS
WEIGHT: 170 LBS | DIASTOLIC BLOOD PRESSURE: 60 MMHG | HEART RATE: 88 BPM | BODY MASS INDEX: 27.44 KG/M2 | SYSTOLIC BLOOD PRESSURE: 102 MMHG

## 2024-06-11 DIAGNOSIS — Z12.31 BREAST CANCER SCREENING BY MAMMOGRAM: ICD-10-CM

## 2024-06-11 DIAGNOSIS — Z01.419 WELL WOMAN EXAM WITH ROUTINE GYNECOLOGICAL EXAM: Primary | ICD-10-CM

## 2024-06-11 PROCEDURE — 99396 PREV VISIT EST AGE 40-64: CPT | Performed by: OBSTETRICS & GYNECOLOGY

## 2024-06-11 RX ORDER — IBUPROFEN 600 MG/1
600 TABLET ORAL EVERY 6 HOURS PRN
Qty: 120 TABLET | Refills: 3 | Status: SHIPPED | OUTPATIENT
Start: 2024-06-11

## 2024-06-11 NOTE — PROGRESS NOTES
SUBJECTIVE:   53 y.o.  female here for annual exam. Pt with irregular menses for the past year and no complaints today.     Review of Systems:  General ROS: negative  Psychological ROS: negative  ENT ROS: negative  Endocrine ROS: negative  Respiratory ROS: no cough, shortness of breath, or wheezing  Cardiovascular ROS: no chest pain or dyspnea on exertion  Gastrointestinal ROS: no abdominal pain, change in bowel habits, or black or bloody stools  Genito-Urinary ROS: no dysuria, trouble voiding, or hematuria  Musculoskeletal ROS: negative  Neurological ROS: no TIA or stroke symptoms  Dermatological ROS: negative    OBJECTIVE:   /60   Pulse 88   Wt 77.1 kg (170 lb)   BMI 27.44 kg/m²     Physical Exam:  CONSTITUTIONAL: She appears well nourished and developed   NEUROLOGIC: Alert and oriented to time, place and person  NECK: no thyroidmegaly  BREASTS: Bilateral breasts without masses, lesions or nipple discharge  LUNGS: Clear to ascultation bilaterally  CVS: regular rate and rhythm  LYMPHATIC: No palpable lymph nodes  ABDOMEN: benign, soft, nontender, no masses. No liver or splenic organomegaly. No evidence of abdominal or inguinal hernia. No indication for occult blood testing  SKIN: normal texture and tone, no lesions  NEURO: normal tone, no hyperreflexia, 1+DTRs throughout    Pelvic Exam:   EFG: normal external genitalia  URETHRAL MEATUS: normal size, no diverticula   URETHRA: normal appearing without diverticula or lesions  BLADDER:  No masses or tenderness  VAGINA: normal rugae, no discharge   CERVIX: parous, no lesions  UTERUS: uterus is normal size, shape, consistency and nontender   ADNEXA: normal adnexa in size, nontender and no masses.  PERINEUM: normal appearing without lesions or masses  RECTUM: no hemorrhoids or rectal masses.     ASSESSMENT:   Routine gynecologic exam    PLAN:   LPS  NILM HPV neg  [facility] referral sent in   Colonoscopy   Rx ibuprofen to pharmacy   Past medical, social

## 2024-06-19 ENCOUNTER — HOSPITAL ENCOUNTER (OUTPATIENT)
Dept: WOMENS IMAGING | Age: 54
Discharge: HOME | End: 2024-06-21
Payer: COMMERCIAL

## 2024-06-19 VITALS — BODY MASS INDEX: 27.45 KG/M2 | HEIGHT: 66 IN

## 2024-06-19 DIAGNOSIS — Z12.31 BREAST CANCER SCREENING BY MAMMOGRAM: ICD-10-CM

## 2024-06-19 DIAGNOSIS — Z01.419 WELL WOMAN EXAM WITH ROUTINE GYNECOLOGICAL EXAM: ICD-10-CM

## 2024-06-19 PROCEDURE — 77063 BREAST TOMOSYNTHESIS BI: CPT

## 2025-03-07 ENCOUNTER — APPOINTMENT (OUTPATIENT)
Dept: PRIMARY CARE | Facility: CLINIC | Age: 55
End: 2025-03-07
Payer: COMMERCIAL

## 2025-03-07 VITALS
HEIGHT: 66 IN | BODY MASS INDEX: 27.32 KG/M2 | HEART RATE: 69 BPM | WEIGHT: 170 LBS | DIASTOLIC BLOOD PRESSURE: 72 MMHG | SYSTOLIC BLOOD PRESSURE: 122 MMHG | OXYGEN SATURATION: 94 %

## 2025-03-07 DIAGNOSIS — J21.9 ACUTE BRONCHIOLITIS DUE TO UNSPECIFIED ORGANISM: ICD-10-CM

## 2025-03-07 DIAGNOSIS — Z13.9 SCREENING FOR CONDITION: ICD-10-CM

## 2025-03-07 DIAGNOSIS — J01.00 ACUTE MAXILLARY SINUSITIS, RECURRENCE NOT SPECIFIED: ICD-10-CM

## 2025-03-07 DIAGNOSIS — E55.9 VITAMIN D DEFICIENCY: ICD-10-CM

## 2025-03-07 DIAGNOSIS — Z00.00 PREVENTATIVE HEALTH CARE: Primary | ICD-10-CM

## 2025-03-07 DIAGNOSIS — Z79.899 DRUG THERAPY: ICD-10-CM

## 2025-03-07 PROCEDURE — 1036F TOBACCO NON-USER: CPT | Performed by: FAMILY MEDICINE

## 2025-03-07 PROCEDURE — 99396 PREV VISIT EST AGE 40-64: CPT | Performed by: FAMILY MEDICINE

## 2025-03-07 PROCEDURE — 3008F BODY MASS INDEX DOCD: CPT | Performed by: FAMILY MEDICINE

## 2025-03-07 PROCEDURE — 99214 OFFICE O/P EST MOD 30 MIN: CPT | Performed by: FAMILY MEDICINE

## 2025-03-07 RX ORDER — AMOXICILLIN AND CLAVULANATE POTASSIUM 875; 125 MG/1; MG/1
TABLET, FILM COATED ORAL
Qty: 20 TABLET | Refills: 0 | Status: SHIPPED | OUTPATIENT
Start: 2025-03-07

## 2025-03-07 NOTE — PROGRESS NOTES
Subjective   Patient ID: Elisabeth Walker is a 54 y.o. female who presents for Annual Check Up (Pt in today for annual check up / sick visit).    Review of Systems  Denies N/V/D/C, no HA/S/V, denies rashes/lesions, no CP/SOB. Denies fevers/chills.  Positive for periorbital pain and pressure, productive cough, fatigue.  All other systems were negative.    Objective   Physical Exam  Gen: NAD  eyes: EOMI, PERRLA  ENT: hearing grossly intact, no nasal discharge  resp: CTABL, without R/R  heart: RRR without MRG  GI: abd: S/ND/NT, BS+  lymph: no axillary, cervical, supraclavicular lymphadenopathy noted   MS: gait grossly WNL,  derm: no rashes or lesions noted  neuro: CN II-XII grossly intact  psych: A&Ox3    Assessment/Plan   There are no diagnoses linked to this encounter.      Preventative visit,   Order labs.    ----    Screening for colon cancer.  Repeat colonoscopy due around 2029.    Screening for cervical cancer as well as breast cancer, sees OB/GYN.  Follow-up as planned.    Screening for hepatitis C nonreactive.    Immunization counseling: Due for annual flu shot, coronavirus series, shingles series. Patient believes most recent tetanus shot was around 2019. -->> Check with your pharmacy to keep up-to-date on immunizations.    -----      Drug therapy, screening for condition, vitamin D deficiency.  Takes 10, 000 units daily. -->>  Recommend increasing to 5000 units daily.  Will check with annual labs.      Maxillary sinusitis with acute bronchitis.  Started about 4 days ago.  Has been taking Mucinex. -->>  Will prescribe Augmentin.  Also recommend you start using Flonase nasal steroid spray, 2 sprays each nostril twice daily for the first couple of days thereafter 2 sprays each nostril once daily until your symptoms have all cleared up for at least a week.  Consider getting the 12-hour Mucinex and taking 1200 mg with large glass of water and plenty of water throughout the day to help you cough up phlegm more  easily.      - Will draw fasting annual labs today.    - Will schedule virtual appointment in about a week to go over lab results.

## 2025-03-07 NOTE — PATIENT INSTRUCTIONS
Preventative visit,   Order labs.    ----    Screening for colon cancer.  Repeat colonoscopy due around 2029.    Screening for cervical cancer as well as breast cancer, sees OB/GYN.  Follow-up as planned.    Screening for hepatitis C nonreactive.    Immunization counseling: Due for annual flu shot, coronavirus series, shingles series. Patient believes most recent tetanus shot was around 2019. -->> Check with your pharmacy to keep up-to-date on immunizations.    -----      Drug therapy, screening for condition, vitamin D deficiency.  Takes 10, 000 units daily. -->>  Recommend increasing to 5000 units daily.  Will check with annual labs.      Maxillary sinusitis with acute bronchitis.  Started about 4 days ago.  Has been taking Mucinex. -->>  Will prescribe Augmentin.  Also recommend you start using Flonase nasal steroid spray, 2 sprays each nostril twice daily for the first couple of days thereafter 2 sprays each nostril once daily until your symptoms have all cleared up for at least a week.  Consider getting the 12-hour Mucinex and taking 1200 mg with large glass of water and plenty of water throughout the day to help you cough up phlegm more easily.      - Will draw fasting annual labs today.    - Will schedule virtual appointment in about a week to go over lab results.

## 2025-03-09 LAB
25(OH)D3+25(OH)D2 SERPL-MCNC: 76 NG/ML (ref 30–100)
ALBUMIN SERPL-MCNC: 4.6 G/DL (ref 3.6–5.1)
ALP SERPL-CCNC: 70 U/L (ref 37–153)
ALT SERPL-CCNC: 22 U/L (ref 6–29)
ANION GAP SERPL CALCULATED.4IONS-SCNC: 12 MMOL/L (CALC) (ref 7–17)
APPEARANCE UR: CLEAR
AST SERPL-CCNC: 26 U/L (ref 10–35)
BASOPHILS # BLD AUTO: 52 CELLS/UL (ref 0–200)
BASOPHILS NFR BLD AUTO: 0.9 %
BILIRUB SERPL-MCNC: 0.3 MG/DL (ref 0.2–1.2)
BILIRUB UR QL STRIP: NEGATIVE
BUN SERPL-MCNC: 13 MG/DL (ref 7–25)
CALCIUM SERPL-MCNC: 9.7 MG/DL (ref 8.6–10.4)
CHLORIDE SERPL-SCNC: 103 MMOL/L (ref 98–110)
CHOLEST SERPL-MCNC: 188 MG/DL
CHOLEST/HDLC SERPL: 2.5 (CALC)
CO2 SERPL-SCNC: 23 MMOL/L (ref 20–32)
COLOR UR: YELLOW
CREAT SERPL-MCNC: 0.63 MG/DL (ref 0.5–1.03)
EGFRCR SERPLBLD CKD-EPI 2021: 106 ML/MIN/1.73M2
EOSINOPHIL # BLD AUTO: 307 CELLS/UL (ref 15–500)
EOSINOPHIL NFR BLD AUTO: 5.3 %
ERYTHROCYTE [DISTWIDTH] IN BLOOD BY AUTOMATED COUNT: 12.4 % (ref 11–15)
EST. AVERAGE GLUCOSE BLD GHB EST-MCNC: 111 MG/DL
EST. AVERAGE GLUCOSE BLD GHB EST-SCNC: 6.2 MMOL/L
GLUCOSE SERPL-MCNC: 88 MG/DL (ref 65–99)
GLUCOSE UR QL STRIP: NEGATIVE
HBA1C MFR BLD: 5.5 % OF TOTAL HGB
HCT VFR BLD AUTO: 45.3 % (ref 35–45)
HDLC SERPL-MCNC: 74 MG/DL
HGB BLD-MCNC: 15.3 G/DL (ref 11.7–15.5)
HGB UR QL STRIP: NEGATIVE
KETONES UR QL STRIP: NEGATIVE
LDLC SERPL CALC-MCNC: 86 MG/DL (CALC)
LEUKOCYTE ESTERASE UR QL STRIP: NEGATIVE
LYMPHOCYTES # BLD AUTO: 1769 CELLS/UL (ref 850–3900)
LYMPHOCYTES NFR BLD AUTO: 30.5 %
MAGNESIUM SERPL-MCNC: 2 MG/DL (ref 1.5–2.5)
MCH RBC QN AUTO: 31.2 PG (ref 27–33)
MCHC RBC AUTO-ENTMCNC: 33.8 G/DL (ref 32–36)
MCV RBC AUTO: 92.4 FL (ref 80–100)
MONOCYTES # BLD AUTO: 667 CELLS/UL (ref 200–950)
MONOCYTES NFR BLD AUTO: 11.5 %
NEUTROPHILS # BLD AUTO: 3004 CELLS/UL (ref 1500–7800)
NEUTROPHILS NFR BLD AUTO: 51.8 %
NITRITE UR QL STRIP: NEGATIVE
NONHDLC SERPL-MCNC: 114 MG/DL (CALC)
PH UR STRIP: 5.5 [PH] (ref 5–8)
PLATELET # BLD AUTO: 280 THOUSAND/UL (ref 140–400)
PMV BLD REES-ECKER: 10.8 FL (ref 7.5–12.5)
POTASSIUM SERPL-SCNC: 4.6 MMOL/L (ref 3.5–5.3)
PROT SERPL-MCNC: 7.4 G/DL (ref 6.1–8.1)
PROT UR QL STRIP: NEGATIVE
RBC # BLD AUTO: 4.9 MILLION/UL (ref 3.8–5.1)
SODIUM SERPL-SCNC: 138 MMOL/L (ref 135–146)
SP GR UR STRIP: 1.01 (ref 1–1.03)
TRIGL SERPL-MCNC: 181 MG/DL
TSH SERPL-ACNC: 1.06 MIU/L
WBC # BLD AUTO: 5.8 THOUSAND/UL (ref 3.8–10.8)

## 2025-03-14 ENCOUNTER — APPOINTMENT (OUTPATIENT)
Dept: PRIMARY CARE | Facility: CLINIC | Age: 55
End: 2025-03-14
Payer: COMMERCIAL

## 2025-03-14 DIAGNOSIS — E78.5 HYPERLIPIDEMIA, UNSPECIFIED HYPERLIPIDEMIA TYPE: ICD-10-CM

## 2025-03-14 DIAGNOSIS — E66.3 OVERWEIGHT: Primary | ICD-10-CM

## 2025-03-14 DIAGNOSIS — Z13.9 SCREENING FOR CONDITION: ICD-10-CM

## 2025-03-14 DIAGNOSIS — Z79.899 DRUG THERAPY: ICD-10-CM

## 2025-03-14 DIAGNOSIS — E55.9 VITAMIN D DEFICIENCY: ICD-10-CM

## 2025-03-14 PROCEDURE — 99214 OFFICE O/P EST MOD 30 MIN: CPT | Performed by: FAMILY MEDICINE

## 2025-03-14 RX ORDER — PHENTERMINE HYDROCHLORIDE 37.5 MG/1
37.5 TABLET ORAL
Qty: 30 TABLET | Refills: 0 | Status: SHIPPED | OUTPATIENT
Start: 2025-03-14 | End: 2025-04-13

## 2025-03-14 NOTE — PROGRESS NOTES
Subjective   Patient ID: Elisabeth Walker is a 54 y.o. female who presents for Virtual Visit (Pt being seen virtually for lab review FU).    Review of Systems  Denies N/V/D/C, no HA/S/V, denies rashes/lesions, no CP/SOB. Denies fevers/chills.  Positive for periorbital pain and pressure, productive cough, fatigue.  All other systems were negative.    Objective   Physical Exam  Gen: NAD  eyes: EOMI, PERRLA  ENT: hearing grossly intact, no nasal discharge  resp: CTABL, without R/R  heart: RRR without MRG  GI: abd: S/ND/NT, BS+  lymph: no axillary, cervical, supraclavicular lymphadenopathy noted   MS: gait grossly WNL,  derm: no rashes or lesions noted  neuro: CN II-XII grossly intact  psych: A&Ox3    Assessment/Plan   There are no diagnoses linked to this encounter.    Virtual or Telephone Consent    An interactive audio and video telecommunication system which permits real time communications between the patient (at the originating site) and provider (at the distant site) was utilized to provide this telehealth service.   Verbal consent was requested and obtained from Elisabeth Walker on this date, 03/14/25 for a telehealth visit and the patient's location was confirmed at the time of the visit.        Preventative visit next due in 2026.    Lab review today.      ----    Screening for colon cancer.  Repeat colonoscopy due around 2029.    Screening for cervical cancer as well as breast cancer, sees OB/GYN.  Follow-up as planned.    Screening for hepatitis C nonreactive.    Immunization counseling: Due for annual flu shot, coronavirus series, shingles series. Patient believes most recent tetanus shot was around 2019. -->> Check with your pharmacy to keep up-to-date on immunizations.    -----    Overweight with serious comorbidity.  BMI 27, with hyperlipidemia.  We discussed several options for medical weight management.  Patient has tried diet and exercise and has been unsuccessful in meeting her goals of weight loss.  Patient  would like to try phentermine.  We discussed benefits and potential risks, and patient and I both agree that potential benefits outweigh potential risks. -->>  Will prescribe phentermine No. 1.     New annual labs reviewed:    -Hyperlipidemia.  LDL is 86, goal is less than 100.  HDL 74, goal is over 45.  Triglycerides slightly elevated at 181.  So overall cholesterol numbers are pretty good on no meds. -->> will recheck with next annual labs.    Drug therapy, screening for condition, A1c is 5.5, was 5.1, so so no diabetes.      vitamin D deficiency, 76, was 27.  Goal is .  Takes 10, 000 units daily. -->>  Recommend increasing to 5000 units daily.  Will check with annual labs.        Maxillary sinusitis with acute bronchitis.  Improving but not yet resolved.       -Patient will return in about 4 weeks for weight management.

## 2025-03-14 NOTE — PATIENT INSTRUCTIONS
Preventative visit next due in 2026.    Lab review today.      ----    Screening for colon cancer.  Repeat colonoscopy due around 2029.    Screening for cervical cancer as well as breast cancer, sees OB/GYN.  Follow-up as planned.    Screening for hepatitis C nonreactive.    Immunization counseling: Due for annual flu shot, coronavirus series, shingles series. Patient believes most recent tetanus shot was around 2019. -->> Check with your pharmacy to keep up-to-date on immunizations.    -----    Overweight with serious comorbidity.  BMI 27, with hyperlipidemia.  We discussed several options for medical weight management.  Patient has tried diet and exercise and has been unsuccessful in meeting her goals of weight loss.  Patient would like to try phentermine.  We discussed benefits and potential risks, and patient and I both agree that potential benefits outweigh potential risks. -->>  Will prescribe phentermine No. 1.     New annual labs reviewed:    -Hyperlipidemia.  LDL is 86, goal is less than 100.  HDL 74, goal is over 45.  Triglycerides slightly elevated at 181.  So overall cholesterol numbers are pretty good on no meds. -->> will recheck with next annual labs.    Drug therapy, screening for condition, A1c is 5.5, was 5.1, so so no diabetes.      vitamin D deficiency, 76, was 27.  Goal is .  Takes 10, 000 units daily. -->>  Recommend increasing to 5000 units daily.  Will check with annual labs.        Maxillary sinusitis with acute bronchitis.  Improving but not yet resolved.       -Patient will return in about 4 weeks for weight management.

## 2025-04-10 ENCOUNTER — APPOINTMENT (OUTPATIENT)
Dept: PRIMARY CARE | Facility: CLINIC | Age: 55
End: 2025-04-10
Payer: COMMERCIAL

## 2025-04-10 VITALS
SYSTOLIC BLOOD PRESSURE: 109 MMHG | BODY MASS INDEX: 26.99 KG/M2 | DIASTOLIC BLOOD PRESSURE: 73 MMHG | WEIGHT: 167.2 LBS | HEART RATE: 82 BPM | OXYGEN SATURATION: 96 %

## 2025-04-10 DIAGNOSIS — E55.9 VITAMIN D DEFICIENCY: Primary | ICD-10-CM

## 2025-04-10 DIAGNOSIS — E66.3 OVERWEIGHT: ICD-10-CM

## 2025-04-10 DIAGNOSIS — E78.5 HYPERLIPIDEMIA, UNSPECIFIED HYPERLIPIDEMIA TYPE: ICD-10-CM

## 2025-04-10 PROCEDURE — 99214 OFFICE O/P EST MOD 30 MIN: CPT | Performed by: FAMILY MEDICINE

## 2025-04-10 PROCEDURE — 1036F TOBACCO NON-USER: CPT | Performed by: FAMILY MEDICINE

## 2025-04-10 RX ORDER — PHENTERMINE HYDROCHLORIDE 37.5 MG/1
37.5 TABLET ORAL
Qty: 30 TABLET | Refills: 0 | Status: SHIPPED | OUTPATIENT
Start: 2025-04-10 | End: 2025-05-10

## 2025-04-10 ASSESSMENT — PATIENT HEALTH QUESTIONNAIRE - PHQ9
1. LITTLE INTEREST OR PLEASURE IN DOING THINGS: NOT AT ALL
SUM OF ALL RESPONSES TO PHQ9 QUESTIONS 1 & 2: 0
2. FEELING DOWN, DEPRESSED OR HOPELESS: NOT AT ALL

## 2025-04-10 ASSESSMENT — ENCOUNTER SYMPTOMS
DEPRESSION: 0
LOSS OF SENSATION IN FEET: 0
OCCASIONAL FEELINGS OF UNSTEADINESS: 0

## 2025-04-10 NOTE — PROGRESS NOTES
Subjective   Patient ID: Elisabeth Walker is a 54 y.o. female who presents for Follow-up (4 week f/u).    Review of Systems  Denies N/V/D/C, no HA/S/V, denies rashes/lesions, no CP/SOB. Denies fevers/chills.  Positive for periorbital pain and pressure, productive cough, fatigue.  All other systems were negative.    Objective   Physical Exam  Gen: NAD  eyes: EOMI, PERRLA  ENT: hearing grossly intact, no nasal discharge  resp: CTABL, without R/R  heart: RRR without MRG  GI: abd: S/ND/NT, BS+  lymph: no axillary, cervical, supraclavicular lymphadenopathy noted   MS: gait grossly WNL,  derm: no rashes or lesions noted  neuro: CN II-XII grossly intact  psych: A&Ox3    Assessment/Plan   There are no diagnoses linked to this encounter.    Preventative visit next due in 2026.    Lab review today.      ----    Screening for colon cancer.  Repeat colonoscopy due around 2029.    Screening for cervical cancer as well as breast cancer, sees OB/GYN.  Follow-up as planned.    Screening for hepatitis C nonreactive.    Immunization counseling: Due for annual flu shot, coronavirus series, shingles series. Patient believes most recent tetanus shot was around 2019. -->> Check with your pharmacy to keep up-to-date on immunizations.    -----    Overweight with serious comorbidity.  BMI 27, with hyperlipidemia. Down 3 LB since last appt here at the end of phentermine No. 1.  Was having some headaches but those are resolved.  Noticing minimal dry mouth.  Does note sleep has improved with improved diet.  Did discuss that were just planning to do 3 months and then be off for probably 6 months before retrying as do not want weight to go too low and patient is starting at the bottom end of the overweight range. -->> Will prescribe phentermine No. 2.     Regarding previous labs:    -Hyperlipidemia.  LDL is 86, goal is less than 100.  HDL 74, goal is over 45.  Triglycerides slightly elevated at 181.  So overall cholesterol numbers are pretty good on  no meds. -->> will recheck with next annual labs.    - Drug therapy, screening for condition, A1c is 5.5, was 5.1, so so no diabetes.      - vitamin D deficiency, 76, was 27.  Goal is .  Since this lab was done patient has decreased from 10,000 daily to 5000 daily vitamin D3.  -->>  Will check with annual labs.      - Patient will return in about 4 weeks for weight management.

## 2025-04-10 NOTE — PATIENT INSTRUCTIONS
Preventative visit next due in 2026.    Lab review today.      ----    Screening for colon cancer.  Repeat colonoscopy due around 2029.    Screening for cervical cancer as well as breast cancer, sees OB/GYN.  Follow-up as planned.    Screening for hepatitis C nonreactive.    Immunization counseling: Due for annual flu shot, coronavirus series, shingles series. Patient believes most recent tetanus shot was around 2019. -->> Check with your pharmacy to keep up-to-date on immunizations.    -----    Overweight with serious comorbidity.  BMI 27, with hyperlipidemia. Down 3 LB since last appt here at the end of phentermine No. 1.  Was having some headaches but those are resolved.  Noticing minimal dry mouth.  Does note sleep has improved with improved diet.  Did discuss that were just planning to do 3 months and then be off for probably 6 months before retrying as do not want weight to go too low and patient is starting at the bottom end of the overweight range. -->> Will prescribe phentermine No. 2.     Regarding previous labs:    -Hyperlipidemia.  LDL is 86, goal is less than 100.  HDL 74, goal is over 45.  Triglycerides slightly elevated at 181.  So overall cholesterol numbers are pretty good on no meds. -->> will recheck with next annual labs.    - Drug therapy, screening for condition, A1c is 5.5, was 5.1, so so no diabetes.      - vitamin D deficiency, 76, was 27.  Goal is .  Since this lab was done patient has decreased from 10,000 daily to 5000 daily vitamin D3.  -->>  Will check with annual labs.      - Patient will return in about 4 weeks for weight management.

## 2025-04-11 ENCOUNTER — APPOINTMENT (OUTPATIENT)
Dept: PRIMARY CARE | Facility: CLINIC | Age: 55
End: 2025-04-11
Payer: COMMERCIAL

## 2025-04-17 DIAGNOSIS — E66.3 OVERWEIGHT: ICD-10-CM

## 2025-04-17 DIAGNOSIS — E78.5 HYPERLIPIDEMIA, UNSPECIFIED HYPERLIPIDEMIA TYPE: ICD-10-CM

## 2025-04-17 RX ORDER — PHENTERMINE HYDROCHLORIDE 37.5 MG/1
37.5 TABLET ORAL
Qty: 30 TABLET | Refills: 0 | Status: SHIPPED | OUTPATIENT
Start: 2025-04-17 | End: 2025-05-17

## 2025-05-08 ENCOUNTER — APPOINTMENT (OUTPATIENT)
Dept: PRIMARY CARE | Facility: CLINIC | Age: 55
End: 2025-05-08
Payer: COMMERCIAL

## 2025-05-08 VITALS
HEIGHT: 66 IN | OXYGEN SATURATION: 99 % | BODY MASS INDEX: 26.15 KG/M2 | SYSTOLIC BLOOD PRESSURE: 108 MMHG | DIASTOLIC BLOOD PRESSURE: 69 MMHG | WEIGHT: 162.7 LBS | HEART RATE: 73 BPM

## 2025-05-08 DIAGNOSIS — E78.5 HYPERLIPIDEMIA, UNSPECIFIED HYPERLIPIDEMIA TYPE: ICD-10-CM

## 2025-05-08 DIAGNOSIS — Z79.899 DRUG THERAPY: ICD-10-CM

## 2025-05-08 DIAGNOSIS — E55.9 VITAMIN D DEFICIENCY: ICD-10-CM

## 2025-05-08 DIAGNOSIS — M25.532 LEFT WRIST PAIN: Primary | ICD-10-CM

## 2025-05-08 DIAGNOSIS — E66.3 OVERWEIGHT: ICD-10-CM

## 2025-05-08 DIAGNOSIS — Z13.9 SCREENING FOR CONDITION: ICD-10-CM

## 2025-05-08 PROCEDURE — 99214 OFFICE O/P EST MOD 30 MIN: CPT | Performed by: FAMILY MEDICINE

## 2025-05-08 PROCEDURE — 3008F BODY MASS INDEX DOCD: CPT | Performed by: FAMILY MEDICINE

## 2025-05-08 RX ORDER — PHENTERMINE HYDROCHLORIDE 37.5 MG/1
37.5 TABLET ORAL
Qty: 30 TABLET | Refills: 0 | Status: SHIPPED | OUTPATIENT
Start: 2025-05-08 | End: 2025-06-07

## 2025-05-08 NOTE — PROGRESS NOTES
Subjective   Patient ID: Elisabeth Walker is a 54 y.o. female who presents for Follow-up (Patient presented today for a 4 week follow up./).    Review of Systems  Denies N/V/D/C, no HA/S/V, denies rashes/lesions, no CP/SOB. Denies fevers/chills.  Positive for periorbital pain and pressure, productive cough, fatigue.  All other systems were negative.    Objective   Physical Exam  Gen: NAD  eyes: EOMI, PERRLA  ENT: hearing grossly intact, no nasal discharge  resp: CTABL, without R/R  heart: RRR without MRG  GI: abd: S/ND/NT, BS+  lymph: no axillary, cervical, supraclavicular lymphadenopathy noted   MS: gait grossly WNL,  derm: no rashes or lesions noted  neuro: CN II-XII grossly intact  psych: A&Ox3    Assessment/Plan   Diagnoses and all orders for this visit:  Overweight  -     phentermine (Adipex-P) 37.5 mg tablet; Take 1 tablet (37.5 mg) by mouth once daily in the morning. Take before meals.  Hyperlipidemia, unspecified hyperlipidemia type  -     phentermine (Adipex-P) 37.5 mg tablet; Take 1 tablet (37.5 mg) by mouth once daily in the morning. Take before meals.      Preventative visit next due in 2026.    Lab review today.      ----    Screening for colon cancer.  Repeat colonoscopy due around 2029.    Screening for cervical cancer as well as breast cancer, sees OB/GYN.  Follow-up as planned.    Screening for hepatitis C nonreactive.    Immunization counseling: Due for annual flu shot, coronavirus series, shingles series. Patient believes most recent tetanus shot was around 2019. -->> Check with your pharmacy to keep up-to-date on immunizations.    -----    Overweight with serious comorbidity.  BMI 26, started at 27, with hyperlipidemia. Down another 5 LB since last appt here at the end of phentermine No. 2. Noticing minimal dry mouth.  Notes eating less sleep.  Did discuss that were just planning to do 3 months and then be off for probably 6 months before retrying as do not want weight to go too low and patient is  starting at the bottom end of the overweight range. -->> Will prescribe phentermine No. 3.    Regarding previous labs:    -Hyperlipidemia.  LDL is 86, goal is less than 100.  HDL 74, goal is over 45.  Triglycerides slightly elevated at 181.  So overall cholesterol numbers are pretty good on no meds. -->> will recheck with next annual labs.    - Drug therapy, screening for condition, A1c is 5.5, was 5.1, so so no diabetes.      - vitamin D deficiency, 76, was 27.  Goal is .  Since this lab was done patient has decreased from 10,000 daily to 5000 daily vitamin D3.  -->>  Will check with annual labs.    L hand/wrist pain, [facility] OA vs radial/carpal joint vs/sprain.  Getting worse over time.  Patient does have an orthopedist she sees as needed. -->>  Take ibuprofen with food as needed, try to hit it a couple of doses a couple of days whenever he gets bad enough to try.  Patient is also talking about looking for a spica splint. Could use ice.      - Patient will return in about 4 moths for weight management, and follow up.

## 2025-05-08 NOTE — PATIENT INSTRUCTIONS
Preventative visit next due in 2026.    Lab review today.      ----    Screening for colon cancer.  Repeat colonoscopy due around 2029.    Screening for cervical cancer as well as breast cancer, sees OB/GYN.  Follow-up as planned.    Screening for hepatitis C nonreactive.    Immunization counseling: Due for annual flu shot, coronavirus series, shingles series. Patient believes most recent tetanus shot was around 2019. -->> Check with your pharmacy to keep up-to-date on immunizations.    -----    Overweight with serious comorbidity.  BMI 26, started at 27, with hyperlipidemia. Down another 5 LB since last appt here at the end of phentermine No. 2. Noticing minimal dry mouth.  Notes eating less sleep.  Did discuss that were just planning to do 3 months and then be off for probably 6 months before retrying as do not want weight to go too low and patient is starting at the bottom end of the overweight range. -->> Will prescribe phentermine No. 3.    Regarding previous labs:    -Hyperlipidemia.  LDL is 86, goal is less than 100.  HDL 74, goal is over 45.  Triglycerides slightly elevated at 181.  So overall cholesterol numbers are pretty good on no meds. -->> will recheck with next annual labs.    - Drug therapy, screening for condition, A1c is 5.5, was 5.1, so so no diabetes.      - vitamin D deficiency, 76, was 27.  Goal is .  Since this lab was done patient has decreased from 10,000 daily to 5000 daily vitamin D3.  -->>  Will check with annual labs.    L hand/wrist pain, CMC OA vs radial/carpal joint vs/sprain.  Getting worse over time.  Patient does have an orthopedist she sees as needed. -->>  Take ibuprofen with food as needed, try to hit it a couple of doses a couple of days whenever he gets bad enough to try.  Patient is also talking about looking for a spica splint. Could use ice.

## 2025-05-22 ENCOUNTER — HOSPITAL ENCOUNTER (OUTPATIENT)
Dept: RADIOLOGY | Facility: CLINIC | Age: 55
Discharge: HOME | End: 2025-05-22
Payer: COMMERCIAL

## 2025-05-22 ENCOUNTER — OFFICE VISIT (OUTPATIENT)
Dept: ORTHOPEDIC SURGERY | Facility: CLINIC | Age: 55
End: 2025-05-22
Payer: COMMERCIAL

## 2025-05-22 DIAGNOSIS — M79.645 PAIN OF LEFT THUMB: ICD-10-CM

## 2025-05-22 DIAGNOSIS — M19.049 CMC ARTHRITIS: ICD-10-CM

## 2025-05-22 DIAGNOSIS — M25.532 LEFT WRIST PAIN: ICD-10-CM

## 2025-05-22 PROCEDURE — 99204 OFFICE O/P NEW MOD 45 MIN: CPT | Performed by: ORTHOPAEDIC SURGERY

## 2025-05-22 PROCEDURE — 1036F TOBACCO NON-USER: CPT | Performed by: ORTHOPAEDIC SURGERY

## 2025-05-22 PROCEDURE — 20600 DRAIN/INJ JOINT/BURSA W/O US: CPT | Mod: LT | Performed by: ORTHOPAEDIC SURGERY

## 2025-05-22 PROCEDURE — 2500000004 HC RX 250 GENERAL PHARMACY W/ HCPCS (ALT 636 FOR OP/ED): Performed by: ORTHOPAEDIC SURGERY

## 2025-05-22 PROCEDURE — 99203 OFFICE O/P NEW LOW 30 MIN: CPT | Mod: 25 | Performed by: ORTHOPAEDIC SURGERY

## 2025-05-22 PROCEDURE — 73110 X-RAY EXAM OF WRIST: CPT | Mod: LT

## 2025-05-22 PROCEDURE — L3924 HFO WITHOUT JOINTS PRE OTS: HCPCS | Performed by: ORTHOPAEDIC SURGERY

## 2025-05-22 RX ORDER — LIDOCAINE HYDROCHLORIDE 10 MG/ML
0.5 INJECTION, SOLUTION INFILTRATION; PERINEURAL
Status: COMPLETED | OUTPATIENT
Start: 2025-05-22 | End: 2025-05-22

## 2025-05-22 RX ADMIN — TRIAMCINOLONE ACETONIDE 5 MG: 10 INJECTION, SUSPENSION INTRA-ARTICULAR; INTRALESIONAL at 08:42

## 2025-05-22 RX ADMIN — LIDOCAINE HYDROCHLORIDE 0.5 ML: 10 INJECTION, SOLUTION INFILTRATION; PERINEURAL at 08:42

## 2025-05-22 NOTE — PROGRESS NOTES
History present illness: Patient presents today for evaluation of pain localized to left thumb CMC joint.  No injury.  Right-hand-dominant.  Otherwise healthy.      Past medical history: The patient's past medical history, family history, social history, and review of systems were documented on the patient medical intake.  The updated data was reviewed in the electronic medical record.  History is negative except otherwise stated in history of present illness.        Physical examination:  General: Alert and oriented to person, place, and time.  No acute distress and breathing comfortably: Pleasant and cooperative with examination.  HEENT: Head is normocephalic and atraumatic.  Neck: Supple, no visible swelling.  Cardiovascular: No palpable tachycardia  Lungs: No audible wheezing or labored breathing  Abdomen: Nondistended.  Extremities: Evaluation of the left upper extremity finds the patient had palpable radial artery at the wrist with brisk capillary refill to all digits.  Patient has intact sensation to axillary radial median and ulnar nerves.  There are no open wounds.  There are no signs of infection.  There is no evidence of lymphedema or lymphatic streaking.  The patient has supple compartments to left arm forearm and hand.  Focal tenderness left thumb at CMC joint      Radiology: Left thumb CMC arthritis      Assessment: Left thumb CMC arthritis      Plan: Treatment options were discussed.  We talked about operative and nonoperative strategies.  Recommendations were made for initial trial of steroid injection and bracing.  Patient is agreeable.  6-week follow-up.  No x-rays upon return.        Procedure:  Hand / UE Inj/Asp: L thumb CMC for osteoarthritis on 5/22/2025 8:42 AM  Indications: pain  Details: 25 G needle, dorsal approach  Medications: 5 mg triamcinolone acetonide 10 mg/mL; 0.5 mL lidocaine 10 mg/mL (1 %)  Outcome: tolerated well, no immediate complications  Procedure, treatment alternatives,  risks and benefits explained, specific risks discussed. Consent was given by the patient. Immediately prior to procedure a time out was called to verify the correct patient, procedure, equipment, support staff and site/side marked as required. Patient was prepped and draped in the usual sterile fashion.

## 2025-07-03 ENCOUNTER — APPOINTMENT (OUTPATIENT)
Dept: ORTHOPEDIC SURGERY | Facility: CLINIC | Age: 55
End: 2025-07-03
Payer: COMMERCIAL

## 2025-08-14 ENCOUNTER — APPOINTMENT (OUTPATIENT)
Dept: ORTHOPEDIC SURGERY | Facility: CLINIC | Age: 55
End: 2025-08-14
Payer: COMMERCIAL

## 2025-08-14 DIAGNOSIS — M19.049 CMC ARTHRITIS: Primary | ICD-10-CM

## 2025-08-14 DIAGNOSIS — M65.4 DE QUERVAIN'S DISEASE (TENOSYNOVITIS): ICD-10-CM

## 2025-08-14 PROCEDURE — 99212 OFFICE O/P EST SF 10 MIN: CPT | Performed by: ORTHOPAEDIC SURGERY

## 2025-08-14 PROCEDURE — 2500000004 HC RX 250 GENERAL PHARMACY W/ HCPCS (ALT 636 FOR OP/ED): Performed by: ORTHOPAEDIC SURGERY

## 2025-08-14 PROCEDURE — 20600 DRAIN/INJ JOINT/BURSA W/O US: CPT | Mod: LT | Performed by: ORTHOPAEDIC SURGERY

## 2025-08-14 PROCEDURE — 20550 NJX 1 TENDON SHEATH/LIGAMENT: CPT | Mod: LT | Performed by: ORTHOPAEDIC SURGERY

## 2025-08-14 RX ORDER — LIDOCAINE HYDROCHLORIDE 10 MG/ML
0.5 INJECTION, SOLUTION INFILTRATION; PERINEURAL
Status: COMPLETED | OUTPATIENT
Start: 2025-08-14 | End: 2025-08-14

## 2025-08-14 RX ORDER — LIDOCAINE HYDROCHLORIDE 10 MG/ML
1 INJECTION, SOLUTION INFILTRATION; PERINEURAL
Status: COMPLETED | OUTPATIENT
Start: 2025-08-14 | End: 2025-08-14

## 2025-08-14 RX ADMIN — LIDOCAINE HYDROCHLORIDE 0.5 ML: 10 INJECTION, SOLUTION INFILTRATION; PERINEURAL at 11:29

## 2025-08-14 RX ADMIN — TRIAMCINOLONE ACETONIDE 5 MG: 10 INJECTION, SUSPENSION INTRA-ARTICULAR; INTRALESIONAL at 11:29

## 2025-08-14 RX ADMIN — TRIAMCINOLONE ACETONIDE 10 MG: 10 INJECTION, SUSPENSION INTRA-ARTICULAR; INTRALESIONAL at 11:29

## 2025-08-14 RX ADMIN — LIDOCAINE HYDROCHLORIDE 1 ML: 10 INJECTION, SOLUTION INFILTRATION; PERINEURAL at 11:29

## 2025-09-25 ENCOUNTER — APPOINTMENT (OUTPATIENT)
Dept: PRIMARY CARE | Facility: CLINIC | Age: 55
End: 2025-09-25
Payer: COMMERCIAL